# Patient Record
Sex: FEMALE | Race: ASIAN | Employment: STUDENT | URBAN - METROPOLITAN AREA
[De-identification: names, ages, dates, MRNs, and addresses within clinical notes are randomized per-mention and may not be internally consistent; named-entity substitution may affect disease eponyms.]

---

## 2024-10-10 ENCOUNTER — OFFICE VISIT (OUTPATIENT)
Dept: PRIMARY CARE CLINIC | Age: 19
End: 2024-10-10

## 2024-10-10 VITALS
HEIGHT: 61 IN | SYSTOLIC BLOOD PRESSURE: 110 MMHG | DIASTOLIC BLOOD PRESSURE: 73 MMHG | BODY MASS INDEX: 17.37 KG/M2 | RESPIRATION RATE: 16 BRPM | WEIGHT: 92 LBS | HEART RATE: 84 BPM | OXYGEN SATURATION: 100 % | TEMPERATURE: 97.3 F

## 2024-10-10 DIAGNOSIS — L23.9 ALLERGIC CONTACT DERMATITIS DUE TO MULTIPLE AGENTS: Primary | ICD-10-CM

## 2024-10-10 DIAGNOSIS — L29.9 PRURITUS: ICD-10-CM

## 2024-10-10 PROBLEM — L23.2 ALLERGIC CONTACT DERMATITIS DUE TO COSMETICS: Status: ACTIVE | Noted: 2024-10-10

## 2024-10-10 PROCEDURE — 99213 OFFICE O/P EST LOW 20 MIN: CPT | Performed by: EMERGENCY MEDICINE

## 2024-10-10 RX ORDER — CETIRIZINE HYDROCHLORIDE 10 MG/1
10 TABLET ORAL NIGHTLY
Qty: 30 TABLET | Refills: 0 | Status: SHIPPED | OUTPATIENT
Start: 2024-10-10 | End: 2024-11-09

## 2024-10-10 RX ORDER — METHYLPREDNISOLONE 4 MG
TABLET, DOSE PACK ORAL
Qty: 1 KIT | Refills: 0 | Status: SHIPPED | OUTPATIENT
Start: 2024-10-10

## 2024-10-10 ASSESSMENT — ENCOUNTER SYMPTOMS
DIARRHEA: 0
WHEEZING: 0
BACK PAIN: 0
VOMITING: 0
SINUS PRESSURE: 0
EYE PAIN: 0
EYE REDNESS: 0
COUGH: 0
ABDOMINAL DISTENTION: 0
SORE THROAT: 0
SHORTNESS OF BREATH: 0
NAUSEA: 0
EYE DISCHARGE: 0

## 2024-10-10 NOTE — PROGRESS NOTES
lb)   LMP 09/09/2024   SpO2 100%   BMI 17.38 kg/m²    Oxygen Saturation Interpretation: Normal.    Physical Exam  Vitals and nursing note reviewed.   Constitutional:       Appearance: She is well-developed.   HENT:      Head: Normocephalic and atraumatic.      Right Ear: Hearing and external ear normal.      Left Ear: Hearing and external ear normal.      Nose: Nose normal.      Mouth/Throat:      Pharynx: Uvula midline.   Eyes:      General: Lids are normal.      Conjunctiva/sclera: Conjunctivae normal.      Pupils: Pupils are equal, round, and reactive to light.   Cardiovascular:      Rate and Rhythm: Normal rate and regular rhythm.      Heart sounds: Normal heart sounds. No murmur heard.  Pulmonary:      Effort: Pulmonary effort is normal.      Breath sounds: Normal breath sounds.   Abdominal:      General: Bowel sounds are normal.      Palpations: Abdomen is soft. Abdomen is not rigid.      Tenderness: There is no abdominal tenderness. There is no guarding or rebound.   Musculoskeletal:      Cervical back: Normal range of motion and neck supple.   Skin:     General: Skin is warm and dry.      Findings: Rash present. No abrasion.      Comments: Patches of scaling, erythematous rash, pruritic in nature on face, trunk and exts   Neurological:      Mental Status: She is alert and oriented to person, place, and time.      GCS: GCS eye subscore is 4. GCS verbal subscore is 5. GCS motor subscore is 6.      Cranial Nerves: No cranial nerve deficit.      Sensory: No sensory deficit.      Coordination: Coordination normal.      Gait: Gait normal.         Test Results Section   (All laboratory and radiology results have been personally reviewed by myself)  Labs:  No results found for this visit on 10/10/24.     Imaging:  All Radiology results interpreted by Radiologist unless otherwise noted.  No results found.      Assessment / Plan   Impression(s):  Sonia was seen today for skin problem.    Diagnoses and all orders

## 2024-10-24 ENCOUNTER — OFFICE VISIT (OUTPATIENT)
Dept: PRIMARY CARE CLINIC | Age: 19
End: 2024-10-24

## 2024-10-24 VITALS
RESPIRATION RATE: 16 BRPM | HEIGHT: 61 IN | SYSTOLIC BLOOD PRESSURE: 105 MMHG | TEMPERATURE: 98 F | BODY MASS INDEX: 17.37 KG/M2 | HEART RATE: 96 BPM | WEIGHT: 92 LBS | OXYGEN SATURATION: 99 % | DIASTOLIC BLOOD PRESSURE: 71 MMHG

## 2024-10-24 DIAGNOSIS — F45.41 STRESS HEADACHE: Primary | ICD-10-CM

## 2024-10-24 PROCEDURE — 99213 OFFICE O/P EST LOW 20 MIN: CPT | Performed by: NURSE PRACTITIONER

## 2024-10-24 RX ORDER — IBUPROFEN 600 MG/1
600 TABLET, FILM COATED ORAL 3 TIMES DAILY PRN
Qty: 30 TABLET | Refills: 0 | Status: SHIPPED | OUTPATIENT
Start: 2024-10-24

## 2024-10-24 NOTE — PROGRESS NOTES
Chief Complaint:   Headache (Started 2 weeks ago with a constant \"extreme migraine, I can't do anything including studying\", nausea. Nothing taken for symptoms. \"I think from the pressure of my studies\". Provider in home country told her to exercise, does not want to take medications because exercise and hot shower help. )    History of Present Illness   Source of history provided by:  patient.     Sonia Mccann is a 18 y.o. old female who presents to the walk-in for complaints of a headache which began 2 weeks ago.  Since recognized, the symptoms have been intermittent. States the symptoms started gradually and progressed.  They have had this before.  Pt denies any recent falls, trauma, dizziness, syncope, CP, SOB, palpitations, nasal congestion, visual loss, visual changes, unilateral weakness, fever, neck stiffness, or recent illness.  Patient believes this is likely due to her school work as when she has free time and does not worry about school the headache is gone. She also has been exercising and taking hot showers which help.     ROS    Unless otherwise stated in this report or unable to obtain because of the patient's clinical or mental status as evidenced by the medical record, this patients's positive and negative responses for Review of Systems, constitutional, psych, eyes, ENT, cardiovascular, respiratory, gastrointestinal, neurological, genitourinary, musculoskeletal, integument systems and systems related to the presenting problem are either stated in the preceding or were negative for the symptoms and/or complaints related to the medical problem.    Past Medical History:  has no past medical history on file.   Past Surgical History:  has no past surgical history on file.  Social History:  reports that she has never smoked. She has never been exposed to tobacco smoke. She has never used smokeless tobacco. She reports that she does not drink alcohol and does not use drugs.  Family History:

## 2024-11-27 ENCOUNTER — OFFICE VISIT (OUTPATIENT)
Dept: PRIMARY CARE CLINIC | Age: 19
End: 2024-11-27

## 2024-11-27 VITALS
RESPIRATION RATE: 16 BRPM | HEART RATE: 76 BPM | TEMPERATURE: 97.4 F | WEIGHT: 91.8 LBS | BODY MASS INDEX: 17.33 KG/M2 | SYSTOLIC BLOOD PRESSURE: 112 MMHG | OXYGEN SATURATION: 99 % | DIASTOLIC BLOOD PRESSURE: 64 MMHG | HEIGHT: 61 IN

## 2024-11-27 DIAGNOSIS — M54.32 SCIATICA, LEFT SIDE: Primary | ICD-10-CM

## 2024-11-27 PROCEDURE — 99213 OFFICE O/P EST LOW 20 MIN: CPT | Performed by: NURSE PRACTITIONER

## 2024-11-27 RX ORDER — METHYLPREDNISOLONE 4 MG/1
TABLET ORAL
Qty: 1 KIT | Refills: 0 | Status: SHIPPED | OUTPATIENT
Start: 2024-11-27

## 2024-11-27 NOTE — PROGRESS NOTES
17.35 kg/m²    Oxygen Saturation Interpretation: Normal.    Constitutional:  Alert, development consistent with age.  Neck:  Normal ROM.  Supple.  No TTP.    Lungs:  Clear to auscultation and breath sounds equal.  Heart:  Regular rate and rhythm, normal heart sounds, without pathological murmurs, ectopy, gallops, or rubs.  Abdomen:  Soft, nontender, good bowel sounds.  No firm or pulsatile mass.  Back: Tenderness: TTP over left side of back with no appreciable midline tenderness.              Swelling: No edema.               Range of Motion: Decreased lateral and front to back ROM due to pain.            CVA Tenderness: No CVA tenderness bilaterally.            Straight leg raising:  Positive straight leg raise.            Skin:  No bruising, redness, abrasions, or rashes.  Distal Function:              Motor deficit: Strength 5/5 in LE's bilaterally.              Sensory deficit: Distal sensation intact.               Pulse deficit: Distal pulses 2+ and bounding.            Calf Tenderness:  No bilateral calf tenderness.  Negative Yusuf's sign bilaterally               Reflexes: Intact at knee and achilles bilaterally.    Gait:  No antalgia noted.  Skin:  Normal turgor.  Warm, dry, without visible rash.  Neurological:  Alert and oriented.  Motor functions intact.    Test Results Section   (All laboratory and radiology results have been personally reviewed by myself)  Labs:  No results found for this visit on 11/27/24.    Imaging:  All Radiology results interpreted by Radiologist unless otherwise noted.  No results found.     Medical Decision Making   Records Review : None    Scripts written for Medrol dosepack, side effects discussed. Advise rest, ice, and/or moist heat for additional relief. PCP in 1-2 weeks if symptoms persist. ED sooner if symptoms worsen or change. ED immediately with any fever, severe or worsening back pain, paresthesias, weakness, or GI/ incontinence. Pt states understanding and is in

## 2025-01-10 ENCOUNTER — OFFICE VISIT (OUTPATIENT)
Dept: PRIMARY CARE CLINIC | Age: 20
End: 2025-01-10

## 2025-01-10 VITALS
DIASTOLIC BLOOD PRESSURE: 65 MMHG | RESPIRATION RATE: 16 BRPM | HEART RATE: 98 BPM | TEMPERATURE: 96.8 F | BODY MASS INDEX: 17.41 KG/M2 | HEIGHT: 62 IN | WEIGHT: 94.6 LBS | SYSTOLIC BLOOD PRESSURE: 94 MMHG | OXYGEN SATURATION: 98 %

## 2025-01-10 DIAGNOSIS — B35.9 RINGWORM: ICD-10-CM

## 2025-01-10 DIAGNOSIS — R21 RASH AND NONSPECIFIC SKIN ERUPTION: Primary | ICD-10-CM

## 2025-01-10 RX ORDER — CLOTRIMAZOLE AND BETAMETHASONE DIPROPIONATE 10; .64 MG/G; MG/G
CREAM TOPICAL
Qty: 15 G | Refills: 0 | Status: SHIPPED | OUTPATIENT
Start: 2025-01-10

## 2025-01-10 NOTE — PROGRESS NOTES
Chief Complaint   Rash (Allover for 2 weeks now)      History of Present Illness   Source of history provided by:  patient.      Sonia Mccann is a 19 y.o. old female presenting to the walk in clinic for evaluation of a rash to the right side right arm and right foot, which pt first noticed 14 days ago. Denies any known cause for the rash including any new soaps, detergents, lotions, foods, or medications.  She does however explain that she used to live in [place] with her cousin who had the same rash.  She reports that the cousin was treated with a antifungal topical cream.  Since onset the symptoms have progressed.  Reports pruritus at the site.  Denies any bleeding or drainage. Denies any lymphangitic streaking, fever, chills, HA , dyspnea, dysphagia, recent illness, myalgias, vomiting, or lethargy. Pt has tried nothing OTC.      ROS    Unless otherwise stated in this report or unable to obtain because of the patient's clinical or mental status as evidenced by the medical record, this patients's positive and negative responses for Review of Systems, constitutional, psych, eyes, ENT, cardiovascular, respiratory, gastrointestinal, neurological, genitourinary, musculoskeletal, integument systems and systems related to the presenting problem are either stated in the preceding or were not pertinent or were negative for the symptoms and/or complaints related to the medical problem.    Past Medical History:  has no past medical history on file.  Past Surgical History:  has no past surgical history on file.  Social History:  reports that she has never smoked. She has never been exposed to tobacco smoke. She has never used smokeless tobacco. She reports that she does not drink alcohol and does not use drugs.  Family History: family history is not on file.   Allergies: Patient has no known allergies.    Physical Exam         VS:  BP 94/65   Pulse 98   Temp 96.8 °F (36 °C) (Oral)   Resp 16   Ht 1.575 m (5'

## 2025-02-19 ENCOUNTER — OFFICE VISIT (OUTPATIENT)
Dept: PRIMARY CARE CLINIC | Age: 20
End: 2025-02-19

## 2025-02-19 VITALS
BODY MASS INDEX: 18.48 KG/M2 | WEIGHT: 94.14 LBS | SYSTOLIC BLOOD PRESSURE: 109 MMHG | DIASTOLIC BLOOD PRESSURE: 71 MMHG | RESPIRATION RATE: 16 BRPM | HEART RATE: 108 BPM | TEMPERATURE: 98.2 F | HEIGHT: 60 IN | OXYGEN SATURATION: 100 %

## 2025-02-19 DIAGNOSIS — B34.9 VIRAL ILLNESS: Primary | ICD-10-CM

## 2025-02-19 DIAGNOSIS — R52 BODY ACHES: ICD-10-CM

## 2025-02-19 LAB
INFLUENZA A ANTIBODY: NEGATIVE
INFLUENZA B ANTIBODY: NEGATIVE
Lab: NORMAL
PERFORMING INSTRUMENT: NORMAL
QC PASS/FAIL: NORMAL
SARS-COV-2, POC: NORMAL

## 2025-02-19 PROCEDURE — 87804 INFLUENZA ASSAY W/OPTIC: CPT | Performed by: NURSE PRACTITIONER

## 2025-02-19 PROCEDURE — 99213 OFFICE O/P EST LOW 20 MIN: CPT | Performed by: NURSE PRACTITIONER

## 2025-02-19 PROCEDURE — 87426 SARSCOV CORONAVIRUS AG IA: CPT | Performed by: NURSE PRACTITIONER

## 2025-02-19 RX ORDER — METHYLPREDNISOLONE 4 MG/1
TABLET ORAL
Qty: 1 KIT | Refills: 0 | Status: SHIPPED | OUTPATIENT
Start: 2025-02-19

## 2025-02-19 RX ORDER — IBUPROFEN 600 MG/1
600 TABLET, FILM COATED ORAL 3 TIMES DAILY PRN
Qty: 30 TABLET | Refills: 0 | Status: SHIPPED | OUTPATIENT
Start: 2025-02-19

## 2025-02-19 RX ORDER — IBUPROFEN 200 MG
600 TABLET ORAL ONCE
Status: COMPLETED | OUTPATIENT
Start: 2025-02-19 | End: 2025-02-19

## 2025-02-19 RX ADMIN — Medication 600 MG: at 11:04

## 2025-02-19 NOTE — PROGRESS NOTES
Chief Complaint:   Shortness of Breath (Patients complains of body aches, fatigue, SOB, lost voice, eyes burning started this morning. )    History of Present Illness   Sonia Mccann  2005  [facility] WALK IN     History of Present Illness  The patient is a 19-year-old female who presents for evaluation of suspected influenza.    She reports experiencing pain with breathing upon awakening this morning, accompanied by pain during inhalation. Additionally, she describes symptoms of generalized body aches, watery eyes, a burning sensation of her eyes, and headaches. These symptoms have been persistent since the previous night, disrupting her sleep and causing her to awaken approximately 7 times. She has not sought relief through over-the-counter medications such as Tylenol or ibuprofen. She also reports no recent exposure to sick individuals or any known medical conditions.    Prior to Visit Medications    Medication Sig Taking? Authorizing Provider   methylPREDNISolone (MEDROL DOSEPACK) 4 MG tablet Take by mouth. Yes Pineda Astudillo APRN - CNP   ibuprofen (ADVIL;MOTRIN) 600 MG tablet Take 1 tablet by mouth 3 times daily as needed for Pain Yes Pineda Astudillo APRN - CNP   clotrimazole-betamethasone (LOTRISONE) 1-0.05 % cream Apply topically 2 times daily.  Patient not taking: Reported on 2/19/2025  Tatum Maynard APRN - CNP     Review of Systems   Unless otherwise stated in this report or unable to obtain because of the patient's clinical or mental status as evidenced by the medical record, this patients's positive and negative responses for Review of Systems, constitutional, psych, eyes, ENT, cardiovascular, respiratory, gastrointestinal, neurological, genitourinary, musculoskeletal, integument systems and systems related to the presenting problem are either stated in the preceding or were not pertinent or were negative for the symptoms and/or complaints related to the medical

## 2025-02-21 ENCOUNTER — OFFICE VISIT (OUTPATIENT)
Dept: PRIMARY CARE CLINIC | Age: 20
End: 2025-02-21

## 2025-02-21 VITALS
WEIGHT: 94 LBS | BODY MASS INDEX: 18.46 KG/M2 | HEART RATE: 66 BPM | DIASTOLIC BLOOD PRESSURE: 63 MMHG | RESPIRATION RATE: 16 BRPM | HEIGHT: 60 IN | TEMPERATURE: 97.2 F | OXYGEN SATURATION: 98 % | SYSTOLIC BLOOD PRESSURE: 98 MMHG

## 2025-02-21 DIAGNOSIS — B34.9 VIRAL ILLNESS: Primary | ICD-10-CM

## 2025-02-21 DIAGNOSIS — R52 BODY ACHES: ICD-10-CM

## 2025-02-21 LAB
INFLUENZA A ANTIBODY: NORMAL
INFLUENZA B ANTIBODY: NORMAL

## 2025-02-21 NOTE — PROGRESS NOTES
po  2025     Sonia Mccann 19 y.o. female    : 2005   Chief Complaint:   Headache (On going HA, body ache and SOB, previously seen [facility]. )      History of Present Illness   Source of history provided by:  patient.    Sonia Mccann is a 19 y.o. old female who presents to walk-in for evaluation of headache x few days. Associated symptoms include body aches and shortness of breath.  Since onset symptoms have been slightly improving.  Patient has had no known Covid 19 exposure.  Patient has not been diagnosed with COVID-19 in the last 90 days.  Has taken Medrol Dosepack at home with some symptomatic relief. Denies any fever, chills, CP, dyspnea, LE edema, abdominal pain, nausea, vomiting, rash, dizziness, or lethargy. Denies any history of asthma, pneumonia, recurrent bronchitis or COPD.  They have no history of tobacco abuse.  Patient was advised that she might have tested too early on Wednesday for a positive test and would like to be retested for influenza.       ROS   Past Medical History: History reviewed. No pertinent past medical history.  Past Surgical History:  has no past surgical history on file.  Social History:  reports that she has never smoked. She has never been exposed to tobacco smoke. She has never used smokeless tobacco. She reports that she does not drink alcohol and does not use drugs.  Family History: family history is not on file.   Allergies: Patient has no known allergies.    Unless otherwise stated in this report the patient's positive and negative responses for review of systems for constitutional, eyes, ENT, cardiovascular, respiratory, gastrointestinal, neurological, , musculoskeletal, and integument systems and related systems to the presenting problem are either stated in the history of present illness or were not pertinent or were negative for the symptoms and/or complaints related to the presenting medical problem.  Positives and